# Patient Record
Sex: FEMALE | Race: WHITE | NOT HISPANIC OR LATINO | URBAN - METROPOLITAN AREA
[De-identification: names, ages, dates, MRNs, and addresses within clinical notes are randomized per-mention and may not be internally consistent; named-entity substitution may affect disease eponyms.]

---

## 2017-08-30 ENCOUNTER — APPOINTMENT (RX ONLY)
Dept: URBAN - METROPOLITAN AREA CLINIC 75 | Facility: CLINIC | Age: 52
Setting detail: DERMATOLOGY
End: 2017-08-30

## 2017-08-30 VITALS — HEIGHT: 70 IN | WEIGHT: 155 LBS

## 2017-08-30 DIAGNOSIS — L66.12 FRONTAL FIBROSING ALOPECIA: ICD-10-CM | Status: STABLE

## 2017-08-30 DIAGNOSIS — L81.4 OTHER MELANIN HYPERPIGMENTATION: ICD-10-CM

## 2017-08-30 DIAGNOSIS — Z71.89 OTHER SPECIFIED COUNSELING: ICD-10-CM

## 2017-08-30 DIAGNOSIS — L72.0 EPIDERMAL CYST: ICD-10-CM

## 2017-08-30 DIAGNOSIS — L66.8 OTHER CICATRICIAL ALOPECIA: ICD-10-CM | Status: STABLE

## 2017-08-30 DIAGNOSIS — L82.1 OTHER SEBORRHEIC KERATOSIS: ICD-10-CM

## 2017-08-30 DIAGNOSIS — D22 MELANOCYTIC NEVI: ICD-10-CM

## 2017-08-30 PROBLEM — D22.5 MELANOCYTIC NEVI OF TRUNK: Status: ACTIVE | Noted: 2017-08-30

## 2017-08-30 PROCEDURE — ? INTRALESIONAL KENALOG

## 2017-08-30 PROCEDURE — ? COUNSELING

## 2017-08-30 PROCEDURE — ? PRESCRIPTION

## 2017-08-30 PROCEDURE — ? IN-HOUSE DISPENSING PHARMACY

## 2017-08-30 PROCEDURE — ? RECOMMENDATIONS

## 2017-08-30 PROCEDURE — 99214 OFFICE O/P EST MOD 30 MIN: CPT | Mod: 25

## 2017-08-30 PROCEDURE — 11900 INJECT SKIN LESIONS </W 7: CPT

## 2017-08-30 PROCEDURE — ? TREATMENT REGIMEN

## 2017-08-30 RX ORDER — FINASTERIDE 5 MG/1
TABLET, FILM COATED ORAL
Qty: 15 | Refills: 6 | Status: ERX | COMMUNITY
Start: 2017-08-30

## 2017-08-30 RX ADMIN — FINASTERIDE: 5 TABLET, FILM COATED ORAL at 15:42

## 2017-08-30 ASSESSMENT — LOCATION SIMPLE DESCRIPTION DERM
LOCATION SIMPLE: UPPER BACK
LOCATION SIMPLE: RIGHT UPPER BACK
LOCATION SIMPLE: RIGHT EYEBROW
LOCATION SIMPLE: LEFT CHEEK
LOCATION SIMPLE: LEFT EYEBROW
LOCATION SIMPLE: SUPERIOR FOREHEAD

## 2017-08-30 ASSESSMENT — LOCATION DETAILED DESCRIPTION DERM
LOCATION DETAILED: RIGHT SUPERIOR MEDIAL UPPER BACK
LOCATION DETAILED: SUPERIOR MID FOREHEAD
LOCATION DETAILED: INFERIOR THORACIC SPINE
LOCATION DETAILED: LEFT INFERIOR CENTRAL MALAR CHEEK
LOCATION DETAILED: LEFT CENTRAL EYEBROW
LOCATION DETAILED: RIGHT CENTRAL EYEBROW

## 2017-08-30 ASSESSMENT — LOCATION ZONE DERM
LOCATION ZONE: FACE
LOCATION ZONE: TRUNK

## 2017-08-30 NOTE — PROCEDURE: RECOMMENDATIONS
Detail Level: Zone
Recommendation Preamble: The following recommendations were made during the visit:
Recommendations (Free Text): Microblading for eyebrows
Recommendations (Free Text): Cautery

## 2017-08-30 NOTE — PROCEDURE: IN-HOUSE DISPENSING PHARMACY
Product 12 Application Directions: Apply to affected area two times a day.
Product 1 Application Directions: Use as face or body wash daily.
Product 46 Units Dispensed: 0
Product 2 Refills: 11
Product 17 Unit Type: mg
Product 41 Application Directions: Apply to hyperpigmented area in the evening after moisturizer.
Name Of Product 41: Hydroquin 6%/ Tret 0.05% Combo Cream - 227241
Product 6 Price/Unit (In Dollars): 50.00
Name Of Product 4: Acne Gel w/ Dapsone 7.5% - 762840
Product 7 Unit Type: grams
Product 12 Refills: 6
Product 16 Application Directions: apply to affected area bid
Product 41 Amount/Unit (Numbers Only): 30
Product 5 Price/Unit (In Dollars): 40.00
Product 3 Application Directions: Apply to acne prone area after moisturizer one time a day.
Product 15 Amount/Unit (Numbers Only): 60
Product 11 Units Dispensed: 1
Product 4 Amount/Unit (Numbers Only): 30
Name Of Product 7: Sod Sulf 10% / Sulf 2%  cream- 174494
Product 4 Refills: 11
Name Of Product 1: Sod Sulfa Body Wash - 970838
Name Of Product 35: Tacro 0.1% Ointment - 171184
Name Of Product 51: Terb 5%/ DMSO Anti- Fungal Nail Solution - 694856
Name Of Product 3: Ángel / Clind Combo - 752017
Product 8 Application Directions: Apply to affected area in the evening after moisturizer.  Avoid eyelids.
Product 7 Amount/Unit (Numbers Only): 60
Product 24 Application Directions: Apply to affected area one time a day.
Name Of Product 12: Iodoquin / Marciano Combo - 713372
Name Of Product 21: Imiquim 5% / Levo 1% Gel - 084102
Product 14 Unit Type: cc's
Detail Level: Zone
Product 51 Amount/Unit (Numbers Only): 15
Name Of Product 42: Kojic Melasma Cream - 905250
Product 6 Application Directions: Apply to affected area after moisturizer one time a day.
Render Refills If Set To 0: Yes
Product 21 Application Directions: Apply to affected area in the evening or every other evening.
Product 2 Application Directions: Apply to affected area in the evening after moisturizer.  Avoid eyelids.
Product 51 Application Directions: Apply to affected nails daily for 10 months.
Name Of Product 31: Ivermec 1% / Met 1%/ Pot Azel Gel - 716042
Name Of Product 11: Clob 0.05% Solution - 864947
Name Of Product 2: Tret 0.05% Cream - 495178
Product 4 Application Directions: Apply to affected area before moisturizer one time a day.
Name Of Product 5: Clind / Tret Combo Cream - 785066
Name Of Product 8: Taza 0.1% Cream - 986576
Product 14 Amount/Unit (Numbers Only): 50
Name Of Product 6: Adap  0.3%/BPO Combo Cream - 284624
Product 5 Application Directions: Apply to affected area in evening after moisturizer. Avoid eyelids.
Name Of Product 15: Triam 0.1%/Calcip 0.005% Psoriasis Ointment- 756061
Name Of Product 24: Triamcin 1% Ointment - 075813
Name Of Product 16: Tacro 0.1% ointment-026693
Product 14 Application Directions: Apply to affected area one to two times a day.
Product 1 Amount/Unit (Numbers Only): 120
Name Of Product 13: Clob 0.05% Cream - 666527
Name Of Product 14: Clob 0.05% Dermatitis Topical Foam - 935820

## 2017-08-30 NOTE — PROCEDURE: INTRALESIONAL KENALOG
Include Z78.9 (Other Specified Conditions Influencing Health Status) As An Associated Diagnosis?: No
X Size Of Lesion In Cm (Optional): 0
Consent: The risks of atrophy were reviewed with the patient.
Detail Level: Detailed
Medical Necessity Clause: This procedure was medically necessary because the lesions that were treated were:
Kenalog Preparation: Kenalog
Concentration Of Solution Injected (Mg/Ml): 10.0
Total Volume Injected (Ccs- Only Use Numbers And Decimals): 0.6